# Patient Record
Sex: MALE | Race: WHITE | Employment: FULL TIME | URBAN - METROPOLITAN AREA
[De-identification: names, ages, dates, MRNs, and addresses within clinical notes are randomized per-mention and may not be internally consistent; named-entity substitution may affect disease eponyms.]

---

## 2020-07-28 ENCOUNTER — OFFICE VISIT (OUTPATIENT)
Dept: FAMILY MEDICINE | Facility: CLINIC | Age: 55
End: 2020-07-28
Payer: COMMERCIAL

## 2020-07-28 VITALS
SYSTOLIC BLOOD PRESSURE: 127 MMHG | HEART RATE: 56 BPM | OXYGEN SATURATION: 98 % | BODY MASS INDEX: 33.26 KG/M2 | DIASTOLIC BLOOD PRESSURE: 82 MMHG | HEIGHT: 71 IN | TEMPERATURE: 97.7 F | WEIGHT: 237.6 LBS

## 2020-07-28 DIAGNOSIS — R10.31 RIGHT INGUINAL PAIN: Primary | ICD-10-CM

## 2020-07-28 PROCEDURE — 99203 OFFICE O/P NEW LOW 30 MIN: CPT | Performed by: PHYSICIAN ASSISTANT

## 2020-07-28 RX ORDER — LOSARTAN POTASSIUM AND HYDROCHLOROTHIAZIDE 25; 100 MG/1; MG/1
1 TABLET ORAL DAILY
COMMUNITY

## 2020-07-28 RX ORDER — AMLODIPINE BESYLATE AND ATORVASTATIN CALCIUM 10; 10 MG/1; MG/1
1 TABLET, FILM COATED ORAL DAILY
COMMUNITY

## 2020-07-28 ASSESSMENT — MIFFLIN-ST. JEOR: SCORE: 1935.88

## 2020-07-28 NOTE — PROGRESS NOTES
Subjective     Bart Givens is a 55 year old male who presents to clinic today for the following health issues:    HPI       New Patient/Transfer of Care-visiting from [facility].  ABDOMINAL   PAIN     Onset: 1 week    Description:   Character: Cramping  Location: right lower quadrant  Radiation: None    Intensity: 0/10 currently    Progression of Symptoms:  worsening    Accompanying Signs & Symptoms:  Fever/Chills?: no   Gas/Bloating: no   Nausea: no   Vomitting: no   Diarrhea?: no   Constipation:no   Dysuria or Hematuria: no    History:   Trauma: no   Previous similar pain: YES- 2 previous hernias   Previous tests done: none    Precipitating factors:   Does the pain change with:     Food: no      BM: no     Urination: no     Alleviating factors:  None    Therapies Tried and outcome: None    LMP:  not applicable     Right inguinal pain.  Had a small lump there and was able to massage that back in place.    He has had inguinal hernias on both sides in the past.  This does feel similar to previous hernias which were repaired (with laparoscopic 25 years ago and the other was open repair 2 years ago).     Currently no pain. He did have pain last night, he felt a lump and was able to massage it back into place.  Sitting and calm no pain, but moving will induce this.     No changes in BM.   No urinary symptoms.       Patient Active Problem List   Diagnosis     Right inguinal pain     History reviewed. No pertinent surgical history.    Social History     Tobacco Use     Smoking status: Never Smoker     Smokeless tobacco: Never Used   Substance Use Topics     Alcohol use: Not Currently     History reviewed. No pertinent family history.      Current Outpatient Medications   Medication Sig Dispense Refill     amLODIPine-atorvastatin (CADUET) 10-10 MG tablet Take 1 tablet by mouth daily       losartan-hydrochlorothiazide (HYZAAR) 100-25 MG tablet Take 1 tablet by mouth daily       BP Readings from Last 3 Encounters:  "  07/28/20 127/82    Wt Readings from Last 3 Encounters:   07/28/20 107.8 kg (237 lb 9.6 oz)                    Reviewed and updated as needed this visit by Provider  Problems         Review of Systems   Constitutional, HEENT, cardiovascular, pulmonary, gi and gu systems are negative, except as otherwise noted.      Objective    /82 (BP Location: Right arm, Patient Position: Sitting, Cuff Size: Adult Large)   Pulse 56   Temp 97.7  F (36.5  C) (Tympanic)   Ht 1.813 m (5' 11.38\")   Wt 107.8 kg (237 lb 9.6 oz)   SpO2 98%   BMI 32.79 kg/m    Body mass index is 32.79 kg/m .  Physical Exam   GENERAL: healthy, alert and no distress  ABDOMEN: soft, nontender, no hepatosplenomegaly, no masses and bowel sounds normal   (male): normal male genitalia without lesions or urethral discharge, no hernia    Diagnostic Test Results:  none         Assessment & Plan     1. Right inguinal pain  Abdominal exam benign today, no pain or bulging mass/hernia noted. Symptoms are similar to previous inguinal hernias (which have been repaired in the past), however I do not appreciate a hernia on exam today.  Further evaluation with surgeon advised.   Hernia    Discussed the general nature of hernias and complications, that not all need repair, timing of repair can be elective depending on symptoms and schedules.  Referred to Surgery for consultation.  No restrictions of activities, depending on symptoms. We discussed worrisome signs and symptoms; If he develops severe pain, is unable to push hernia back in, or has a change in bowel, he is to f/u in ER immediately.      - GENERAL SURG ADULT REFERRAL; Future     BMI:   Estimated body mass index is 32.79 kg/m  as calculated from the following:    Height as of this encounter: 1.813 m (5' 11.38\").    Weight as of this encounter: 107.8 kg (237 lb 9.6 oz).           CONSULTATION/REFERRAL to surgeon    Return in about 2 weeks (around 8/11/2020) for surgical consult.    Ivonne Lara, " COURTNEY  [facility]

## 2020-07-29 ENCOUNTER — AMBULATORY - HEALTHEAST (OUTPATIENT)
Dept: FAMILY MEDICINE | Facility: CLINIC | Age: 55
End: 2020-07-29

## 2020-07-29 ENCOUNTER — OFFICE VISIT - HEALTHEAST (OUTPATIENT)
Dept: SURGERY | Facility: CLINIC | Age: 55
End: 2020-07-29

## 2020-07-29 ENCOUNTER — AMBULATORY - HEALTHEAST (OUTPATIENT)
Dept: SURGERY | Facility: AMBULATORY SURGERY CENTER | Age: 55
End: 2020-07-29

## 2020-07-29 ENCOUNTER — COMMUNICATION - HEALTHEAST (OUTPATIENT)
Dept: SURGERY | Facility: CLINIC | Age: 55
End: 2020-07-29

## 2020-07-29 DIAGNOSIS — K40.20 NON-RECURRENT BILATERAL INGUINAL HERNIA WITHOUT OBSTRUCTION OR GANGRENE: ICD-10-CM

## 2020-07-29 DIAGNOSIS — Z11.59 ENCOUNTER FOR SCREENING FOR OTHER VIRAL DISEASES: ICD-10-CM

## 2020-07-29 ASSESSMENT — MIFFLIN-ST. JEOR
SCORE: 1906.27
SCORE: 1906.27

## 2020-07-30 ENCOUNTER — ANESTHESIA - HEALTHEAST (OUTPATIENT)
Dept: SURGERY | Facility: AMBULATORY SURGERY CENTER | Age: 55
End: 2020-07-30

## 2020-07-31 ENCOUNTER — SURGERY - HEALTHEAST (OUTPATIENT)
Dept: SURGERY | Facility: AMBULATORY SURGERY CENTER | Age: 55
End: 2020-07-31

## 2020-07-31 ENCOUNTER — COMMUNICATION - HEALTHEAST (OUTPATIENT)
Dept: SCHEDULING | Facility: CLINIC | Age: 55
End: 2020-07-31

## 2020-07-31 ENCOUNTER — HOSPITAL ENCOUNTER (OUTPATIENT)
Dept: SURGERY | Facility: AMBULATORY SURGERY CENTER | Age: 55
Discharge: HOME | End: 2020-07-31
Attending: SURGERY | Admitting: SURGERY
Payer: COMMERCIAL

## 2020-07-31 DIAGNOSIS — K40.20 NON-RECURRENT BILATERAL INGUINAL HERNIA WITHOUT OBSTRUCTION OR GANGRENE: ICD-10-CM

## 2020-07-31 ASSESSMENT — MIFFLIN-ST. JEOR
SCORE: 1906.27
SCORE: 1906.27

## 2020-08-13 ENCOUNTER — OFFICE VISIT - HEALTHEAST (OUTPATIENT)
Dept: SURGERY | Facility: CLINIC | Age: 55
End: 2020-08-13

## 2020-08-13 DIAGNOSIS — K40.90 RIGHT INGUINAL HERNIA: ICD-10-CM

## 2021-01-04 ENCOUNTER — HEALTH MAINTENANCE LETTER (OUTPATIENT)
Age: 56
End: 2021-01-04

## 2021-07-22 VITALS
WEIGHT: 230 LBS | HEIGHT: 72 IN | BODY MASS INDEX: 31.15 KG/M2 | WEIGHT: 230 LBS | BODY MASS INDEX: 31.74 KG/M2 | WEIGHT: 230 LBS | DIASTOLIC BLOOD PRESSURE: 74 MMHG | HEIGHT: 72 IN | SYSTOLIC BLOOD PRESSURE: 132 MMHG | BODY MASS INDEX: 31.15 KG/M2

## 2021-07-22 NOTE — PROGRESS NOTES
"Bart Givens is a 55 y.o. male who is being evaluated via a billable telephone visit.      The patient has been notified of following:     \"This telephone visit will be conducted via a call between you and your physician/provider. We have found that certain health care needs can be provided without the need for a physical exam.  This service lets us provide the care you need with a short phone conversation.  If a prescription is necessary we can send it directly to your pharmacy.  If lab work is needed we can place an order for that and you can then stop by our lab to have the test done at a later time.    Telephone visits are billed at different rates depending on your insurance coverage. During this emergency period, for some insurers they may be billed the same as an in-person visit.  Please reach out to your insurance provider with any questions.    If during the course of the call the physician/provider feels a telephone visit is not appropriate, you will not be charged for this service.\"    Patient has given verbal consent to a Telephone visit? Yes    HPI: Pt is s/p   LAPAROSCOPIC RIGHT INGUINAL HERNIA REPAIR      with Dr. Saini on 7/31/20.   he is doing well.  Pain is well controlled:  Yes. No difficulties with the surgical wound/wounds, no reports of erythema or drainage.  he is eating well and denies fever and chills.  Bowel function has returned to normal.  Pleased with his recovery.         Assessment/Plan: Doing well after surgery and should follow up as needed.    Bear Rahman PA-C  [phone]  General Surgery       Phone call duration: 6 minutes    Bear Rahman PA-C    "

## 2021-07-22 NOTE — OP NOTE
Name:  Bart Givens  PCP:  Provider, No Primary Care  Procedure Date:  7/31/2020      LAPAROSCOPIC RIGHT INGUINAL HERNIA REPAIR (Right)    Pre-Procedure Diagnosis:  Non-recurrent bilateral inguinal hernia without obstruction or gangrene [K40.20]     Post-Procedure Diagnosis:    * Right indirect inguinal hernia    Surgeon(s):  Volodymyr Saini MD    No Physician Assistant or First Assist has been documented in procedure    Anesthesia Type:  GET      Findings:  R indirect inguinal hernia    Operative Note:  The patient was brought to the operating room placed in the supine position and given general endotracheal anesthesia.  He was sterilely prepped and draped in the usual surgical fashion.    Curvilinear incision was made beneath the umbilicus the subcu tissues were dissected through bluntly with S retractors and the superficial rectus sheath fascia was scored with a scalpel and opened with Metzenbaum scissors.  The underlying rectus muscle was retracted laterally and superficially and the dilation balloon catheter was advanced superficial to the deep rectus sheath but deep to the rectus muscle.  Under direct visualization of 0  laparoscope the dilation balloon was brought up with 20 pumps of air.  The dilation balloon catheter was replaced with a pneumatic footplate trocar.  The balloon at the end of the trocar was inflated and a 3 peritoneum was brought up to 15 mmHg.  2  5 mm trochars were brought in under direct visualization in the lower midline.  The preperitoneal space was dissected out with blunt dissection and a hernia defect was noted just lateral to the epigastric vessels along side the spermatic cord, consistant with an indirect hernia.  The hernia sac was dissected free from the spermatic cord and reduced from the internal inguinal ring.  The peritoneum was reflected back from the pelvic floor and a large Bard 3-D max mesh was inserted and was advanced into the preperitoneal space.  It was  unfurled in the appropriate orientation 40 mL of local anesthesia were instilled into that preperitoneal space and the pneumo preperitoneum was deflated.    Closure was undertaken and I closed the abdominal wall.  First the peritoneum and then the fascia with 0 Vicryl suture.  I closed the skin with a running 4-0 subcuticular Monocryl suture.  The 2, 5 mm trocar sites were both closed at the level of the skin with a 4-0 subcu Monocryl stitch.  The wounds were dressed with Telfa and Tegaderm    Estimated Blood Loss: 5 cc  * No blood loss documented between In Room and Out of Room log events - 7/31/2020 12:35 PM to 7/31/2020  2:19 PM *    Specimens:    None       Drains:        Complications:    None    Volodymyr Saini     Date: 7/31/2020  Time: 2:31 PM

## 2021-07-22 NOTE — ANESTHESIA CARE TRANSFER NOTE
Last vitals:   Vitals:    07/31/20 1422   BP: 159/78   Pulse: 87   Resp: 16   Temp: 36.6  C (97.8  F)   SpO2: 98%     Patient's level of consciousness is drowsy  Spontaneous respirations: yes  Maintains airway independently: yes  Dentition unchanged: yes  Oropharynx: oropharynx clear of all foreign objects    QCDR Measures:  ASA# 20 - Surgical Safety Checklist: WHO surgical safety checklist completed prior to induction    PQRS# 430 - Adult PONV Prevention: 4558F - Pt received => 2 anti-emetic agents (different classes) preop & intraop  ASA# 8 - Peds PONV Prevention: NA - Not pediatric patient, not GA or 2 or more risk factors NOT present  PQRS# 424 - Edna-op Temp Management: 4559F - At least one body temp DOCUMENTED => 35.5C or 95.9F within required timeframe  PQRS# 426 - PACU Transfer Protocol: - Transfer of care checklist used  ASA# 14 - Acute Post-op Pain: ASA14B - Patient did NOT experience pain >= 7 out of 10

## 2021-07-22 NOTE — TELEPHONE ENCOUNTER
Spoke with Bart regarding surgery for this Friday. Went over the following details:    Surgery Date: Friday July 31st    Location: [facility]                 3rd Floor, [facility]                  [address] [place] [address]     Arrival Time: 11:00 AM (unless instructed otherwise by the preop nurse)    Prep:     1. Dr. Mckeon will do pre-op prior to surgery.    2. COVID19 testing is required. That is scheduled for today at the [facility] in [address] at 2:00 PM. Bart verbalized understanding.    3. Nothing to eat or drink for 8 hours before surgery unless instructed differently by the preop nurse.    4. No blood thinners including aspirin for one week prior to surgery. Verify this is safe for you with your primary care doctor before stopping.     5. You need an adult to drive you home and stay with you 24 hours after surgery. Because of COVID19 related visitor restrictions, your escort cannot accompany you to the center. A nurse will call when you are ready to be picked up.    6. When you arrive to the hospital, you will be screened for COVID19 symptoms. If you screen positive, your surgery will need to be postponed for your safety.    7. If the community sees a new surge in COVID19 hospital admissions, your procedure may need to be postponed. We will contact you if this happens.    8. We always encourage you to notify your insurance any time you have something scheduled including surgery. The number is usually right on the back of your insurance card.     Call our office if you have any questions! Thank you!     Rosa VALENTIN [facility], General Surgery  Surgery Scheduler  [phone] (Direct Line)  [phone] (Main Line)

## 2021-07-22 NOTE — ANESTHESIA PREPROCEDURE EVALUATION
Anesthesia Evaluation        Airway    Pulmonary                           Cardiovascular   (+) hypertension well controlled, ,      Neuro/Psych      Endo/Other    (+) obesity,      GI/Hepatic/Renal       Other findings: Results for ERROL HAWKINS (MRN [id number]) as of 7/31/2020 07:59    7/29/2020 14:00  2019-nCOV: Not Detected  COVID-19 VIRUS SPECIMEN SOURCE: Nasopharyngeal        Dental                         Anesthesia Plan  Planned anesthetic: general endotracheal  GAETT  Scopolamine for PONV  Antiemetics  Tylenol po pre op  Toradol at the end of case if okay with surgeon  Consider background propofol  Soft bite block  ASA 2

## 2021-07-22 NOTE — ANESTHESIA POSTPROCEDURE EVALUATION
Patient: Bart Givens  Procedure(s):  LAPAROSCOPIC RIGHT INGUINAL HERNIA REPAIR (Right)  Anesthesia type: general    Patient location: Phase II Recovery  Last vitals:   Vitals Value Taken Time   /76 7/31/2020  3:00 PM   Temp 36.4  C (97.6  F) 7/31/2020  2:59 PM   Pulse 67 7/31/2020  3:02 PM   Resp 16 7/31/2020  2:59 PM   SpO2 96 % 7/31/2020  3:02 PM   Vitals shown include unvalidated device data.  Post vital signs: stable  Level of consciousness: awake and responds to simple questions  Post-anesthesia pain: pain controlled  Post-anesthesia nausea and vomiting: no  Pulmonary: unassisted, return to baseline  Cardiovascular: stable and blood pressure at baseline  Hydration: adequate  Anesthetic events: no    QCDR Measures:  ASA# 11 - Edna-op Cardiac Arrest: ASA11B - Patient did NOT experience unanticipated cardiac arrest  ASA# 12 - Edna-op Mortality Rate: ASA12B - Patient did NOT die  ASA# 13 - PACU Re-Intubation Rate: ASA13B - Patient did NOT require a new airway mgmt  ASA# 10 - Composite Anes Safety: ASA10A - No serious adverse event    Additional Notes:

## 2021-07-22 NOTE — H&P (VIEW-ONLY)
I was asked to consult on this pt by Provider, No Primary Care for evaluation a hernia.    HPI:  This is a 55 y.o. male here today with concerns of pain and bulging in his right groin. He has noted this for the past 2 week(s). The discomfort he is experiencing is a deep gnawing pain that is worse toward the end of the day.    Allergies:Patient has no known allergies.    Past Medical History:   Diagnosis Date     Hypertension        Past Surgical History:   Procedure Laterality Date     CHOLECYSTECTOMY       HERNIA REPAIR      L ing hernia     HERNIA REPAIR      R ing hernia        CURRENT MEDS:  Current Outpatient Medications   Medication Sig Dispense Refill     amLODIPine (NORVASC) 10 MG tablet Take 10 mg by mouth daily.       losartan-hydrochlorothiazide (HYZAAR) 100-25 mg per tablet Take 1 tablet by mouth daily.       No current facility-administered medications for this visit.        Family History   Problem Relation Age of Onset     Hypertension Mother      Hypertension Father         reports that he has never smoked. He has never used smokeless tobacco. He reports current alcohol use.    Review of Systems -   10 point Review of systems is negative except for; as mentioned above in HPI and PMHx    /74 (Patient Site: Right Arm, Patient Position: Sitting, Cuff Size: Adult Regular)   Wt (!) 230 lb (104.3 kg)   There is no height or weight on file to calculate BMI.    EXAM:  GENERAL: Well developed male, he is tall  HEENT: EOMI, Anicteric Sclera  NECK:  No masses, good flexion and extention of the neck  CARDIAC: RRR w/out murmur   CHEST/LUNG: Clear  ABDOMEN: right inguinal hernia.   GENITAL: Both testicles descended without masses  NEURO: He is ambulatory with good strength in both legs.    IMAGES: none    Assessment/Plan: Pt with a right inguinal hernia. I discussed this at length with him.  I went over conservative management as well as surgical treatment for hernias.   I would reccomend a laparoscopic  inguinal hernia repair, understanding the possibility of converting to an open operation.   I went over the small risks of surgery including but not limited to bleeding and infection. I discussed the expected recovery time as well. We will schedule this hernia repair at his earliest convenience.    Volodymyr Saini MD  [facility]  [phone]

## 2021-07-22 NOTE — PROGRESS NOTES
I was asked to consult on this pt by Provider, No Primary Care for evaluation a hernia.    HPI:  This is a 55 y.o. male here today with concerns of pain and bulging in his right groin. He has noted this for the past 2 week(s). The discomfort he is experiencing is a deep gnawing pain that is worse toward the end of the day.    Allergies:Patient has no known allergies.    Past Medical History:   Diagnosis Date     Hypertension        Past Surgical History:   Procedure Laterality Date     CHOLECYSTECTOMY       HERNIA REPAIR      L ing hernia     HERNIA REPAIR      R ing hernia        CURRENT MEDS:  Current Outpatient Medications   Medication Sig Dispense Refill     amLODIPine (NORVASC) 10 MG tablet Take 10 mg by mouth daily.       losartan-hydrochlorothiazide (HYZAAR) 100-25 mg per tablet Take 1 tablet by mouth daily.       No current facility-administered medications for this visit.        Family History   Problem Relation Age of Onset     Hypertension Mother      Hypertension Father         reports that he has never smoked. He has never used smokeless tobacco. He reports current alcohol use.    Review of Systems -   10 point Review of systems is negative except for; as mentioned above in HPI and PMHx    /74 (Patient Site: Right Arm, Patient Position: Sitting, Cuff Size: Adult Regular)   Wt (!) 230 lb (104.3 kg)   There is no height or weight on file to calculate BMI.    EXAM:  GENERAL: Well developed male, he is tall  HEENT: EOMI, Anicteric Sclera  NECK:  No masses, good flexion and extention of the neck  CARDIAC: RRR w/out murmur   CHEST/LUNG: Clear  ABDOMEN: right inguinal hernia.   GENITAL: Both testicles descended without masses  NEURO: He is ambulatory with good strength in both legs.    IMAGES: none    Assessment/Plan: Pt with a right inguinal hernia. I discussed this at length with him.  I went over conservative management as well as surgical treatment for hernias.   I would reccomend a laparoscopic  inguinal hernia repair, understanding the possibility of converting to an open operation.   I went over the small risks of surgery including but not limited to bleeding and infection. I discussed the expected recovery time as well. We will schedule this hernia repair at his earliest convenience.    Volodymyr Saini MD  [facility]  [phone]

## 2021-10-11 ENCOUNTER — HEALTH MAINTENANCE LETTER (OUTPATIENT)
Age: 56
End: 2021-10-11

## 2022-01-30 ENCOUNTER — HEALTH MAINTENANCE LETTER (OUTPATIENT)
Age: 57
End: 2022-01-30

## 2022-09-24 ENCOUNTER — HEALTH MAINTENANCE LETTER (OUTPATIENT)
Age: 57
End: 2022-09-24

## 2023-05-08 ENCOUNTER — HEALTH MAINTENANCE LETTER (OUTPATIENT)
Age: 58
End: 2023-05-08